# Patient Record
Sex: MALE
[De-identification: names, ages, dates, MRNs, and addresses within clinical notes are randomized per-mention and may not be internally consistent; named-entity substitution may affect disease eponyms.]

---

## 2020-06-22 ENCOUNTER — NURSE TRIAGE (OUTPATIENT)
Dept: OTHER | Facility: CLINIC | Age: 65
End: 2020-06-22

## 2020-06-22 NOTE — TELEPHONE ENCOUNTER
Reason for Disposition   [1] COVID-19 infection suspected by caller or triager AND [2] mild symptoms (cough, fever, or others) AND [1] no complications or SOB    Answer Assessment - Initial Assessment Questions  1. COVID-19 DIAGNOSIS: \"Who made your Coronavirus (COVID-19) diagnosis? \" \"Was it confirmed by a positive lab test?\" If not diagnosed by a HCP, ask \"Are there lots of cases (community spread) where you live? \" (See public health department website, if unsure)      NA  2. ONSET: \"When did the COVID-19 symptoms start? \"       6/18/20  3. WORST SYMPTOM: \"What is your worst symptom? \" (e.g., cough, fever, shortness of breath, muscle aches)      \"deep congestion\"  4. COUGH: \"Do you have a cough? \" If so, ask: \"How bad is the cough? \"        Mild  5. FEVER: \"Do you have a fever? \" If so, ask: \"What is your temperature, how was it measured, and when did it start? \"      No  6. RESPIRATORY STATUS: \"Describe your breathing? \" (e.g., shortness of breath, wheezing, unable to speak)       \"crackling sound\"  7. BETTER-SAME-WORSE: Osker Lavender you getting better, staying the same or getting worse compared to yesterday? \"  If getting worse, ask, \"In what way? \"      better  8. HIGH RISK DISEASE: \"Do you have any chronic medical problems? \" (e.g., asthma, heart or lung disease, weak immune system, etc.)      NO  9. PREGNANCY: \"Is there any chance you are pregnant? \" \"When was your last menstrual period? \"      NA  10. OTHER SYMPTOMS: \"Do you have any other symptoms? \"  (e.g., chills, fatigue, headache, loss of smell or taste, muscle pain, sore throat)        Cough, sob, sore throat,    Protocols used: CORONAVIRUS (COVID-19) DIAGNOSED OR SUSPECTED-ADULT-    Patient called in via [address] [place] [address] to report having symptoms of cough, sore throat, fatigue, and sob. Pt states his symptoms started 6/18/20 but feels as if he is getting better. Describes his cough as mild and denies breathing difficulty at this time.     Patient informed of